# Patient Record
Sex: FEMALE | Race: WHITE | NOT HISPANIC OR LATINO | URBAN - METROPOLITAN AREA
[De-identification: names, ages, dates, MRNs, and addresses within clinical notes are randomized per-mention and may not be internally consistent; named-entity substitution may affect disease eponyms.]

---
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
## 2022-08-08 NOTE — PROCEDURE: PRESCRIPTION MEDICATION MANAGEMENT
Plan: Recommend patient mother to purchase foaming  glycolic wash to use in scalp and face to use 2 times a week
Detail Level: Zone
Detail Level: Detailed
Add 56729 Cpt? (Important Note: In 2017 The Use Of 48291 Is Being Tracked By Cms To Determine Future Global Period Reimbursement For Global Periods): no